# Patient Record
Sex: FEMALE | Race: WHITE
[De-identification: names, ages, dates, MRNs, and addresses within clinical notes are randomized per-mention and may not be internally consistent; named-entity substitution may affect disease eponyms.]

---

## 2018-06-14 ENCOUNTER — HOSPITAL ENCOUNTER (EMERGENCY)
Dept: HOSPITAL 7 - FB.ED | Age: 63
Discharge: HOME | End: 2018-06-14
Payer: MEDICARE

## 2018-06-14 VITALS — SYSTOLIC BLOOD PRESSURE: 145 MMHG | DIASTOLIC BLOOD PRESSURE: 58 MMHG

## 2018-06-14 DIAGNOSIS — M19.90: ICD-10-CM

## 2018-06-14 DIAGNOSIS — Z87.891: ICD-10-CM

## 2018-06-14 DIAGNOSIS — M25.562: Primary | ICD-10-CM

## 2018-06-14 DIAGNOSIS — K21.9: ICD-10-CM

## 2018-06-14 DIAGNOSIS — Z79.899: ICD-10-CM

## 2018-06-14 DIAGNOSIS — Z88.1: ICD-10-CM

## 2018-06-14 DIAGNOSIS — Z88.6: ICD-10-CM

## 2018-06-14 DIAGNOSIS — Z91.09: ICD-10-CM

## 2018-06-14 PROCEDURE — 73562 X-RAY EXAM OF KNEE 3: CPT

## 2018-06-14 PROCEDURE — 76881 US COMPL JOINT R-T W/IMG: CPT

## 2018-06-14 PROCEDURE — 99283 EMERGENCY DEPT VISIT LOW MDM: CPT

## 2018-06-14 NOTE — US
INDICATION:  Sudden onset of left popliteal pain while walking, possible Baker
s cyst or ruptured plantaris tendon, doubt internal derangement.



EXTREMITY, NONVASCULAR, ULTRASOUND, LEFT KNEE AREA:  Multiple ultrasonic images 
were obtained 06/14/2018.  No comparisons were available.



Collection of fluid is noted posteromedially in the popliteal fossa, measuring 
5.9 x 1 cm.  Medial and lateral collections of fluid are smaller in size 
adjacent to the patella.  Medially, the fluid collection measured 3.35 x 1.6 cm 
x 3.4 mm and is located somewhat anteriorly medial to the patella and adjacent 
to the patella.  Lateral to the patella anteriorly, there are two collections, 
which appear to be connected by a channel, measuring approximately 4 x 4.8 cm 
and 4.89 x 0.7 x 3 cm.  Etiology is indeterminate.  However, the possibility of 
a large Bakers cyst with rupture and flow of fluid into these spaces would be 
considerations.  No solid masses could be identified.  The fluid collections 
are largely anechoic, suggesting simple fluid rather than hemorrhage or 
hematoma.  This should be correlated clinically.  



Aspiration of these sites could be of further diagnostic benefit, as well as MRI
, as felt to be clinically necessary.  



Report was called to Dr. Das at approximately 1121 hours on 06/14/2018.

BOBBY

## 2018-06-14 NOTE — EDM.PDOC
ED HPI GENERAL MEDICAL PROBLEM





- General


Chief Complaint: Lower Extremity Injury/Pain


Stated Complaint: LEFT KNEE


Time Seen by Provider: 18 09:45


Source of Information: Reports: Patient, Family


History Limitations: Reports: No Limitations





- History of Present Illness


INITIAL COMMENTS - FREE TEXT/NARRATIVE: 





Marina comes into [facility] ED with acute onset of posterior L knee pain while 

ambulating this am. The L knee "popped" in this location, and she has had 

problems with wt bearing since. There is no hx of swelling, injury, or 

splinting. She has tried no meds. 


  ** L knee


Pain Score (Numeric/FACES): 10





- Related Data


 Allergies











Allergy/AdvReac Type Severity Reaction Status Date / Time


 


celecoxib [From Celebrex] Allergy  Swelling Verified 18 09:13


 


ibuprofen Allergy  Swelling Verified 18 09:13


 


dye Allergy  Swelling Uncoded 18 09:13











Home Meds: 


 Home Meds





Brimonidine [Alphagan P 0.15% Ophth Soln] 1 drop EYEBOTH TID 11/10/16 [History]


Citalopram Hydrobromide [Celexa] 20 mg PO BEDTIME 11/10/16 [History]


Cyclobenzaprine HCl 10 mg PO BEDTIME PRN 11/10/16 [History]


Dextran/Hypromellose/Glycerin [Genteal Tears 0.1%-0.2%-0.3%] 1 drop OP 

ASDIRECTED PRN 11/10/16 [History]


Gabapentin [Neurontin] 300 mg PO TID 11/10/16 [History]


Latanoprost [Xalatan 0.005% Ophth Soln] 1 drop EYEBOTH BEDTIME 11/10/16 [History

]


Naproxen Sodium [Aleve] 220 mg PO BID PRN 11/10/16 [History]


Omeprazole 20 mg PO BEDTIME 11/10/16 [History]


Vit A/Vit C/Vit E/Zinc/Copper [Preservision] 1 cap PO BID 11/10/16 [History]


traMADol [Ultram] 50 mg PO Q4H PRN #20 tab 18 [Rx]











Past Medical History


HEENT History: Reports: Glaucoma, Impaired Vision


Gastrointestinal History: Reports: GERD


Genitourinary History: Reports: Urinary Incontinence, Other (See Below)


Other Genitourinary History: occ leaking of urine


OB/GYN History: Reports: Pregnancy


Other OB/BYN History: 


Musculoskeletal History: Reports: Arthritis, Fracture, Other (See Below)


Other Musculoskeletal History: hx fx nose, R gt toe


Neurological History: Reports: Brain Injury, Concussion


Psychiatric History: Reports: Depression


Endocrine/Metabolic History: Reports: Obesity/BMI 30+





- Infectious Disease History


Infectious Disease History: Reports: Measles





- Past Surgical History


HEENT Surgical History: Reports: Adenoidectomy, Laser Surgery, Tonsillectomy


Other HEENT Surgeries/Procedures: laser surg bilat eyes


GI Surgical History: Reports: None


Female  Surgical History: Reports: Hysterectomy, Salpingo-Oophorectomy


Musculoskeletal Surgical History: Reports: Carpal Tunnel, Knee Replacement


Other Musculoskeletal Surgeries/Procedures:: R knee replacment, bilat carpal 

tunnel, R heel surgery





Social & Family History





- Family History


Family Medical History: Noncontributory





- Tobacco Use


Smoking Status *Q: Former Smoker


Years of Tobacco use: 10


Used Tobacco, but Quit: Yes


Month/Year Tobacco Last Used: 





- Caffeine Use


Caffeine Use: Reports: Coffee





- Recreational Drug Use


Recreational Drug Use: No





Review of Systems





- Review of Systems


Review Of Systems: See Below


Constitutional: Reports: No Symptoms


Eyes: Reports: No Symptoms


Ears: Reports: No Symptoms


Nose: Reports: No Symptoms


Mouth/Throat: Reports: No Symptoms


Respiratory: Reports: No Symptoms


Cardiovascular: Reports: No Symptoms


GI/Abdominal: Reports: No Symptoms


Genitourinary: Reports: No Symptoms


Musculoskeletal: Reports: Leg Pain (sharp pain L posterior knee and proximal 

calve)


Skin: Reports: No Symptoms


Neurological: Reports: No Symptoms


Psychiatric: Reports: No Symptoms





ED EXAM, GENERAL





- Physical Exam


Exam: See Below


Exam Limited By: Physical Impairment


General Appearance: Alert, WD/WN, Anxious, Mild Distress


Head: Normocephalic


Neck: Normal Inspection, Supple, Non-Tender


Respiratory/Chest: Lungs Clear


Cardiovascular: Regular Rate, Rhythm


Back Exam: Normal Inspection


Extremities: Normal Inspection, No Pedal Edema, Limited Range of Motion (

guarding L knee, no joint effusion, patella well seated, no lax to maneuver; 

tendeness in popliteal fossa and proximal midline calve, no discernable 

swelling or ecchymoses)


Neurological: Alert, Oriented, CN II-XII Intact, No Motor/Sensory Deficits


Psychiatric: Normal Affect, Anxious


Skin Exam: Warm, Dry, Intact, Normal Color, No Rash


Lymphatic: No Adenopathy





Course





- Vital Signs


Text/Narrative:: 





Following assessment at the [facility] ED, I administered a Tramadol 50 mg po, and 

sent patient to diagnostic imaging for plain filsm of L knee and L ext US. 

findings for a large fluid collection around the knee and patella, suspicious 

for a ruptured Bakers Cyst. 


Last Recorded V/S: 


 Last Vital Signs











Temp  36.3 C   18 09:08


 


Pulse  89   18 09:08


 


Resp  20   18 09:08


 


BP  142/82 H  18 09:08


 


Pulse Ox  100   18 09:08














- Orders/Labs/Meds


Orders: 


 Active Orders 24 hr











 Category Date Time Status


 


 Extremity Non Vascular Lt [US] Stat Exams  18 10:14 Taken











Meds: 


Medications














Discontinued Medications














Generic Name Dose Route Start Last Admin





  Trade Name Freq  PRN Reason Stop Dose Admin


 


Tramadol HCl  50 mg  18 10:10  18 10:23





  Ultram  PO  18 10:11  50 mg





  ONETIME ONE   Administration





     





     





     





     














Departure





- Departure


Time of Disposition: 11:29


Disposition: Home, Self-Care 01


Condition: Fair


Clinical Impression: 


Knee pain, acute


Qualifiers:


 Laterality: left Qualified Code(s): M25.562 - Pain in left knee








- Discharge Information


Prescriptions: 


traMADol [Ultram] 50 mg PO Q4H PRN #20 tab


 PRN Reason: Breakthrough Pain


Referrals: 


PCP,None [Primary Care Provider] - 


Forms:  ED Department Discharge





- Problem List & Annotations


(1) Knee pain, acute


SNOMED Code(s): 89711963, 229941168


   Code(s): M25.569 - PAIN IN UNSPECIFIED KNEE   Status: Acute   Current Visit: 

Yes   Annotation/Comment:: I dispensed Tramadol 50 mg q 4hrs prn for pain, RICE

, crutches with touch wt bearing, and consider Ortho consult regarding further 

managment.    


Qualifiers: 


   Laterality: left   Qualified Code(s): M25.562 - Pain in left knee   





- Problem List Review


Problem List Initiated/Reviewed/Updated: Yes





- My Orders


Last 24 Hours: 


My Active Orders





18 10:14


Extremity Non Vascular Lt [US] Stat 














- Assessment/Plan


Last 24 Hours: 


My Active Orders





18 10:14


Extremity Non Vascular Lt [US] Stat 











Plan: 





Follow up with Orthopedics.

## 2018-06-14 NOTE — CR
INDICATION:  Posterior popliteal and proximal calf pain, possible Bakers cyst 
or plantaris rupture, doubt internal derangement, no injury history.



LEFT KNEE:  Three views of the left knee were obtained 06/14/2018 and revealed 
narrowing of the medial femorotibial joint space with mild hypertrophic 
degenerative changes off the patella and femur at the patellofemoral joint.  
Medial patellofemoral joint space narrowing is mild also.  



Hypertrophic changes are noted at the intercondylar spines and off the medial 
femur and only very minimally laterally off the tibia.  



There is some prominence at the suprapatellar bursa, raising question of a knee 
joint effusion.  This should be correlated clinically, however.



A fracture or dislocation was not identified.



IMPRESSION:  

1.  Osteoarthritis with joint space loss, as noted above.

2.  Possible small knee joint effusion.  

MTDD

## 2020-07-20 NOTE — EDM.PDOC
ED HPI GENERAL MEDICAL PROBLEM





- General


Chief Complaint: General


Time Seen by Provider: 20 07:40


Source of Information: Reports: Patient


History Limitations: Reports: No Limitations





- History of Present Illness


INITIAL COMMENTS - FREE TEXT/NARRATIVE: 





c/o anxiety





pt has h/o anxiety and panic attacks





also h/o 30 pack smoking hx altho no prior CV concerns





at midnight pt said she felt like she had worms crawling all over her skin





not able to sleep





denied pain, by morning did develop sob, shakiness in her hands, tingling in her

hands





she thinks she may be having an allergic reaction





no cp, no n/v, no f/c/d





- Related Data


                                    Allergies











Allergy/AdvReac Type Severity Reaction Status Date / Time


 


celecoxib [From Celebrex] Allergy  Swelling Verified 10/17/18 20:18


 


ibuprofen Allergy  Swelling Verified 10/17/18 20:18


 


dye Allergy  Swelling Uncoded 18 09:13











Home Meds: 


                                    Home Meds





Citalopram Hydrobromide [Celexa] 20 mg PO BEDTIME 11/10/16 [History]


Cyclobenzaprine HCl 10 mg PO BEDTIME PRN 11/10/16 [History]


Dextran/Hypromellose/Glycerin [Genteal Tears 0.1%-0.2%-0.3%] 1 drop OP 

ASDIRECTED PRN 11/10/16 [History]


Gabapentin [Neurontin] 600 mg PO BEDTIME 11/10/16 [History]


Naproxen Sodium [Aleve] 440 mg PO TID PRN 11/10/16 [History]


Omeprazole 20 mg PO ACBREAKFAST 11/10/16 [History]


Vit A/Vit C/Vit E/Zinc/Copper [Preservision] 1 cap PO BID 11/10/16 [History]


Gabapentin [Neurontin] 200 mg PO TIDMEALS 18 [History]











Past Medical History


HEENT History: Reports: Cataract, Glaucoma, Impaired Vision


Cardiovascular History: Reports: None, Blood Clots/VTE/DVT


Respiratory History: Reports: None


Gastrointestinal History: Reports: GERD


Genitourinary History: Reports: Renal Calculus, Urinary Incontinence, Other (See

 Below)


Other Genitourinary History: occ leaking of urine


OB/GYN History: Reports: Pregnancy


Other OB/GYN History: 


Musculoskeletal History: Reports: Arthritis, Fracture, RA, Other (See Below)


Other Musculoskeletal History: hx fx nose, R gt toe


Neurological History: Reports: Brain Injury, Concussion


Psychiatric History: Reports: Depression


Endocrine/Metabolic History: Reports: Obesity/BMI 30+


Hematologic History: Reports: None


Immunologic History: Reports: None


Oncologic (Cancer) History: Reports: None


Dermatologic History: Reports: None





- Infectious Disease History


Infectious Disease History: Reports: None, Measles





- Past Surgical History


Head Surgeries/Procedures: Reports: None


HEENT Surgical History: Reports: Adenoidectomy, Laser Surgery, Tonsillectomy


Other HEENT Surgeries/Procedures: laser surg bilat eyes


Cardiovascular Surgical History: Reports: None


GI Surgical History: Reports: None


Female  Surgical History: Reports: Hysterectomy, Salpingo-Oophorectomy


Musculoskeletal Surgical History: Reports: Carpal Tunnel, Knee Replacement


Other Musculoskeletal Surgeries/Procedures:: R knee replacment, bilat carpal 

tunnel, R heel surgery





Social & Family History





- Family History


Family Medical History: Noncontributory





- Caffeine Use


Caffeine Use: Reports: Coffee


Other Caffeine Use: decaf





ED ROS GENERAL





- Review of Systems


Review Of Systems: See Below


Constitutional: Reports: No Symptoms


HEENT: Reports: No Symptoms


Respiratory: Reports: Shortness of Breath


Cardiovascular: Reports: No Symptoms


Endocrine: Reports: No Symptoms


GI/Abdominal: Reports: No Symptoms


: Reports: No Symptoms


Musculoskeletal: Reports: No Symptoms


Skin: Reports: No Symptoms


Neurological: Reports: Tingling, Tremors


Psychiatric: Reports: No Symptoms


Hematologic/Lymphatic: Reports: No Symptoms


Immunologic: Reports: No Symptoms





ED EXAM, GENERAL





- Physical Exam


Exam: See Below


Exam Limited By: No Limitations


General Appearance: Alert, WD/WN, Mild Distress, Other (as I walked into room, 

pt was visibly anxious, hyperventilating, fanning her face, hands were shaking, 

voice was tremulous, she did relax as I spoke to her)


Eye Exam: Bilateral Eye: EOMI, PERRL


Ears: Hearing Grossly Normal


Nose: Normal Inspection, Normal Mucosa, No Blood


Throat/Mouth: Normal Inspection, Normal Lips, No Airway Compromise


Head: Atraumatic, Normocephalic


Neck: Normal Inspection, Supple, Non-Tender, Full Range of Motion.  No: 

Lymphadenopathy (R), Lymphadenopathy (L)


Respiratory/Chest: No Respiratory Distress, Lungs Clear, Normal Breath Sounds, 

No Accessory Muscle Use, Chest Non-Tender


Cardiovascular: Regular Rate, Rhythm, No Edema, No Gallop, No Murmur, No Rub


GI/Abdominal: Soft, Non-Tender, No Distention, No Mass


Back Exam: Normal Inspection, Full Range of Motion.  No: CVA Tenderness (R), CVA

 Tenderness (L)


Extremities: Normal Inspection, Normal Range of Motion, No Pedal Edema


Neurological: Alert, Oriented, CN II-XII Intact, No Motor/Sensory Deficits


Psychiatric: Anxious


Skin Exam: Warm, Dry, Intact, Normal Color, No Rash


Lymphatic: No Adenopathy





Course





- Vital Signs


Last Recorded V/S: 


                                Last Vital Signs











Temp  36.6 C   20 07:45


 


Pulse  97   20 07:45


 


Resp  20   20 07:45


 


BP  147/75 H  20 07:45


 


Pulse Ox  99   20 07:45














- Orders/Labs/Meds


Orders: 


                               Active Orders 24 hr











 Category Date Time Status


 


 EKG Documentation Completion [RC] ASDIRECTED Care  20 07:49 Active


 


 Sodium Chloride 0.9% [Normal Saline] 1,000 ml Med  20 10:45 Active





 IV ASDIRECTED   


 


 Sodium Chloride 0.9% [Saline Flush] Med  20 10:25 Active





 10 ml FLUSH ASDIRECTED PRN   


 


 EKG 12 Lead [EK] Routine Ther  20 07:48 Ordered








                                Medication Orders





Sodium Chloride (Normal Saline)  1,000 mls @ 999 mls/hr IV ASDIRECTED ZOE


   Last Admin: 20 09:45  Dose: 999 mls/hr


   Documented by: SOLANGE


Sodium Chloride (Saline Flush)  10 ml FLUSH ASDIRECTED PRN


   PRN Reason: IV Use


   Last Admin: 20 08:45  Dose: 10 ml


   Documented by: SOLANGE








Labs: 


                                Laboratory Tests











  20 Range/Units





  08:00 08:00 08:00 


 


WBC  8.7    (4.5-12.0)  X10-3/uL


 


RBC  4.86    (3.23-5.20)  x10(6)uL


 


Hgb  13.6    (11.5-15.5)  g/dL


 


Hct  42.5    (30.0-51.3)  %


 


MCV  87.5    (80-96)  fL


 


MCH  28.0    (27.7-33.6)  pg


 


MCHC  32.0 L    (32.2-35.4)  g/dL


 


RDW  13.3    (11.5-15.5)  %


 


Plt Count  293    (125-369)  X10(3)uL


 


MPV  8.5    (7.4-10.4)  fL


 


Neut % (Auto)  43.8 L    (46-82)  %


 


Lymph % (Auto)  42.5 H    (13-37)  %


 


Mono % (Auto)  10.3    (4-12)  %


 


Eos % (Auto)  3    (1.0-5.0)  %


 


Baso % (Auto)  1    (0-2)  %


 


Neut # (Auto)  3.8    (1.6-8.3)  #


 


Lymph # (Auto)  3.7    (0.6-5.0)  #


 


Mono # (Auto)  0.9    (0.0-1.3)  #


 


Eos # (Auto)  0.2    (0.0-0.8)  #


 


Baso # (Auto)  0.1    (0.0-0.2)  #


 


Sodium   137   (135-145)  mmol/L


 


Potassium   4.7   (3.5-5.3)  mmol/L


 


Chloride   100   (100-110)  mmol/L


 


Carbon Dioxide   25   (21-32)  mmol/L


 


BUN   21 H D   (7-18)  mg/dL


 


Creatinine   1.0   (0.55-1.02)  mg/dL


 


Est Cr Clr Drug Dosing   TNP   


 


Estimated GFR (MDRD)   56 L   (>60)  


 


BUN/Creatinine Ratio   21.0 H   (9-20)  


 


Glucose   101   ()  mg/dL


 


Calcium   9.8   (8.6-10.2)  mg/dL


 


Total Bilirubin   0.3   (0.1-1.3)  mg/dL


 


AST   18  D   (5-25)  IU/L


 


ALT   26  D   (12-36)  U/L


 


Alkaline Phosphatase   71   ()  IU/L


 


Troponin I    27.3  (4.0-60.3)  pg/mL


 


C-Reactive Protein    1.0 H  (0.5-0.9)  mg/dL


 


Total Protein   8.0   (6.0-8.0)  g/dL


 


Albumin   3.9   (3.2-4.6)  g/dL


 


Globulin   4.1   g/dL


 


Albumin/Globulin Ratio   1.0   


 


Urine Color     (YELLOW)  


 


Urine Appearance     (CLEAR)  


 


Urine pH     (5.0-6.5)  


 


Ur Specific Gravity     (1.010-1.025)  


 


Urine Protein     (NEGATIVE)  mg/dL


 


Urine Glucose (UA)     (NORMAL)  mg/dL


 


Urine Ketones     (NEGATIVE)  mg/dL


 


Urine Occult Blood     (NEGATIVE)  


 


Urine Nitrite     (NEGATIVE)  


 


Urine Bilirubin     (NEGATIVE)  


 


Urine Urobilinogen     (NEGATIVE)  mg/dL


 


Ur Leukocyte Esterase     (NEGATIVE)  


 


Urine RBC     (0-5)  


 


Urine WBC     (0-5)  














  20/ Range/Units





  10:56 


 


WBC   (4.5-12.0)  X10-3/uL


 


RBC   (3.23-5.20)  x10(6)uL


 


Hgb   (11.5-15.5)  g/dL


 


Hct   (30.0-51.3)  %


 


MCV   (80-96)  fL


 


MCH   (27.7-33.6)  pg


 


MCHC   (32.2-35.4)  g/dL


 


RDW   (11.5-15.5)  %


 


Plt Count   (125-369)  X10(3)uL


 


MPV   (7.4-10.4)  fL


 


Neut % (Auto)   (46-82)  %


 


Lymph % (Auto)   (13-37)  %


 


Mono % (Auto)   (4-12)  %


 


Eos % (Auto)   (1.0-5.0)  %


 


Baso % (Auto)   (0-2)  %


 


Neut # (Auto)   (1.6-8.3)  #


 


Lymph # (Auto)   (0.6-5.0)  #


 


Mono # (Auto)   (0.0-1.3)  #


 


Eos # (Auto)   (0.0-0.8)  #


 


Baso # (Auto)   (0.0-0.2)  #


 


Sodium   (135-145)  mmol/L


 


Potassium   (3.5-5.3)  mmol/L


 


Chloride   (100-110)  mmol/L


 


Carbon Dioxide   (21-32)  mmol/L


 


BUN   (7-18)  mg/dL


 


Creatinine   (0.55-1.02)  mg/dL


 


Est Cr Clr Drug Dosing   


 


Estimated GFR (MDRD)   (>60)  


 


BUN/Creatinine Ratio   (9-20)  


 


Glucose   ()  mg/dL


 


Calcium   (8.6-10.2)  mg/dL


 


Total Bilirubin   (0.1-1.3)  mg/dL


 


AST   (5-25)  IU/L


 


ALT   (12-36)  U/L


 


Alkaline Phosphatase   ()  IU/L


 


Troponin I   (4.0-60.3)  pg/mL


 


C-Reactive Protein   (0.5-0.9)  mg/dL


 


Total Protein   (6.0-8.0)  g/dL


 


Albumin   (3.2-4.6)  g/dL


 


Globulin   g/dL


 


Albumin/Globulin Ratio   


 


Urine Color  Yellow  (YELLOW)  


 


Urine Appearance  Clear  (CLEAR)  


 


Urine pH  7.0 H  (5.0-6.5)  


 


Ur Specific Gravity  1.005 L  (1.010-1.025)  


 


Urine Protein  Negative  (NEGATIVE)  mg/dL


 


Urine Glucose (UA)  Normal  (NORMAL)  mg/dL


 


Urine Ketones  Negative  (NEGATIVE)  mg/dL


 


Urine Occult Blood  Negative  (NEGATIVE)  


 


Urine Nitrite  Negative  (NEGATIVE)  


 


Urine Bilirubin  Negative  (NEGATIVE)  


 


Urine Urobilinogen  Normal  (NEGATIVE)  mg/dL


 


Ur Leukocyte Esterase  Negative  (NEGATIVE)  


 


Urine RBC  Not seen  (0-5)  


 


Urine WBC  Not seen  (0-5)  











Meds: 


Medications











Generic Name Dose Route Start Last Admin





  Trade Name Freq  PRN Reason Stop Dose Admin


 


Sodium Chloride  1,000 mls @ 999 mls/hr  20 10:45  20 09:45





  Normal Saline  IV   999 mls/hr





  ASDIRECTED ZOE   Administration


 


Sodium Chloride  10 ml  20 10:25  20 08:45





  Saline Flush  FLUSH   10 ml





  ASDIRECTED PRN   Administration





  IV Use  














Discontinued Medications














Generic Name Dose Route Start Last Admin





  Trade Name Freq  PRN Reason Stop Dose Admin


 


Sodium Chloride  1,000 mls @ 999 mls/hr  20 07:51  20 08:26





  Normal Saline  IV  20 08:51  999 mls/hr





  .BOLUS ONE   Administration


 


Lorazepam  1 mg  20 07:50  20 08:21





  Ativan  IVPUSH  20 07:51  1 mg





  ONETIME ONE   Administration














- Re-Assessments/Exams


Free Text/Narrative Re-Assessment/Exam: 





20 11:40


labs neg except inc'd BUN, did void after 1+ liter of fluid, urine not 

concentrated





w/u neg





anxiety and mild panic attack appeared to drive sxs





Departure





- Departure


Time of Disposition: 11:38


Disposition: Home, Self-Care 01


Condition: Good


Clinical Impression: 


 Moderate dehydration, Anxiety








- Discharge Information


*PRESCRIPTION DRUG MONITORING PROGRAM REVIEWED*: Not Applicable


*COPY OF PRESCRIPTION DRUG MONITORING REPORT IN PATIENT EJ: Not Applicable


Instructions:  Rehydration, Adult


Referrals: 


PCP,None [Primary Care Provider] - 


Forms:  ED Department Discharge


Additional Instructions: 


Continue current meds.





Maintain fluids. Drink at least 1.5 liters daily when indoors, 2.5 liters when 

spending part of the day outdoors.





See your doctor in 3-4 days for further evaluation and recommendations.





Sepsis Event Note (ED)





- Evaluation


Sepsis Screening Result: No Definite Risk





- Focused Exam


Vital Signs: 


                                   Vital Signs











  Temp Pulse Resp BP Pulse Ox


 


 20 07:45  36.6 C  97  20  147/75 H  99














- My Orders


Last 24 Hours: 


My Active Orders





20 07:48


EKG 12 Lead [EK] Routine 





20 07:49


EKG Documentation Completion [RC] ASDIRECTED 





20 10:25


Sodium Chloride 0.9% [Saline Flush]   10 ml FLUSH ASDIRECTED PRN 





20 10:45


Sodium Chloride 0.9% [Normal Saline] 1,000 ml IV ASDIRECTED 














- Assessment/Plan


Last 24 Hours: 


My Active Orders





20 07:48


EKG 12 Lead [EK] Routine 





20 07:49


EKG Documentation Completion [RC] ASDIRECTED 





20 10:25


Sodium Chloride 0.9% [Saline Flush]   10 ml FLUSH ASDIRECTED PRN 





20 10:45


Sodium Chloride 0.9% [Normal Saline] 1,000 ml IV ASDIRECTED